# Patient Record
Sex: FEMALE
[De-identification: names, ages, dates, MRNs, and addresses within clinical notes are randomized per-mention and may not be internally consistent; named-entity substitution may affect disease eponyms.]

---

## 2023-08-31 ENCOUNTER — RX ONLY (RX ONLY)
Age: 43
End: 2023-08-31

## 2023-08-31 RX ORDER — NITROFURANTOIN MACROCRYSTALS 100 MG/1
CAPSULE ORAL
Qty: 20 | Refills: 0 | Status: ERX | COMMUNITY
Start: 2023-08-31

## 2023-08-31 RX ORDER — FLUCONAZOLE 150 MG/1
TABLET ORAL
Qty: 2 | Refills: 1 | Status: ERX | COMMUNITY
Start: 2023-08-31

## 2023-10-26 ENCOUNTER — APPOINTMENT (OUTPATIENT)
Dept: URBAN - METROPOLITAN AREA CLINIC 318 | Age: 43
Setting detail: DERMATOLOGY
End: 2023-10-26

## 2023-10-26 DIAGNOSIS — Z12.83 ENCOUNTER FOR SCREENING FOR MALIGNANT NEOPLASM OF SKIN: ICD-10-CM

## 2023-10-26 PROCEDURE — OTHER COUNSELING: OTHER

## 2023-10-26 NOTE — HPI: PREVENTATIVE SKIN CHECK
Okay, I have prescribed the oral clindamycin as prescribed.       Roxann Marr MD     What Is The Reason For Today's Visit?: Preventative Skin Check

## 2023-11-16 ENCOUNTER — RX ONLY (RX ONLY)
Age: 43
End: 2023-11-16

## 2023-11-16 RX ORDER — BIMATOPROST 0.3 MG/ML
SOLUTION/ DROPS OPHTHALMIC
Qty: 5 | Refills: 7 | Status: ERX | COMMUNITY
Start: 2023-11-16

## 2024-02-07 ENCOUNTER — RX ONLY (RX ONLY)
Age: 44
End: 2024-02-07

## 2024-02-07 RX ORDER — FLUCONAZOLE 150 MG/1
TABLET ORAL
Qty: 2 | Refills: 1 | Status: ERX | COMMUNITY
Start: 2024-02-07

## 2024-02-20 ENCOUNTER — RX ONLY (RX ONLY)
Age: 44
End: 2024-02-20

## 2024-02-20 RX ORDER — VALACYCLOVIR HYDROCHLORIDE 1 G/1
TABLET, FILM COATED ORAL
Qty: 30 | Refills: 3 | Status: ERX | COMMUNITY
Start: 2024-02-20

## 2024-04-10 ENCOUNTER — RX ONLY (RX ONLY)
Age: 44
End: 2024-04-10

## 2024-04-10 RX ORDER — ECONAZOLE NITRATE 10 MG/G
CREAM TOPICAL BID
Qty: 30 | Refills: 2 | Status: CANCELLED

## 2024-04-10 RX ORDER — MICONAZOLE NITRATE 1 %
KIT TOPICAL
Qty: 30 | Refills: 3 | Status: CANCELLED

## 2024-04-24 ENCOUNTER — RX ONLY (RX ONLY)
Age: 44
End: 2024-04-24

## 2024-04-24 RX ORDER — AZITHROMYCIN DIHYDRATE 250 MG/1
TABLET, FILM COATED ORAL
Qty: 1 | Refills: 1 | Status: ERX | COMMUNITY
Start: 2024-04-24

## 2024-09-01 ENCOUNTER — RX ONLY (RX ONLY)
Age: 44
End: 2024-09-01

## 2024-09-01 RX ORDER — FLUCONAZOLE 200 MG/1
1 TABLET TABLET ORAL
Qty: 2 | Refills: 4 | Status: CANCELLED

## 2024-09-01 RX ORDER — FLUCONAZOLE 150 MG/1
TABLET ORAL
Qty: 2 | Refills: 2 | Status: ERX

## 2024-12-11 ENCOUNTER — RX ONLY (RX ONLY)
Age: 44
End: 2024-12-11

## 2024-12-11 RX ORDER — CIPROFLOXACIN HYDROCHLORIDE 3 MG/ML
SOLUTION OPHTHALMIC
Qty: 5 | Refills: 1 | Status: ERX | COMMUNITY
Start: 2024-12-11

## 2024-12-19 ENCOUNTER — RX ONLY (RX ONLY)
Age: 44
End: 2024-12-19

## 2024-12-19 RX ORDER — AZITHROMYCIN DIHYDRATE 250 MG/1
TABLET, FILM COATED ORAL
Qty: 1 | Refills: 1 | Status: ERX

## 2025-01-01 ENCOUNTER — RX ONLY (RX ONLY)
Age: 45
End: 2025-01-01

## 2025-01-01 RX ORDER — AZITHROMYCIN DIHYDRATE 250 MG/1
TABLET, FILM COATED ORAL
Qty: 1 | Refills: 1 | Status: ACTIVE

## 2025-01-02 ENCOUNTER — APPOINTMENT (OUTPATIENT)
Age: 45
Setting detail: DERMATOLOGY
End: 2025-01-02

## 2025-01-06 ENCOUNTER — RX ONLY (RX ONLY)
Age: 45
End: 2025-01-06

## 2025-01-06 RX ORDER — AMOXICILLIN AND CLAVULANATE POTASSIUM 500; 125 MG/1; 1/1
TABLET, FILM COATED ORAL
Qty: 20 | Refills: 0 | Status: ERX | COMMUNITY
Start: 2025-01-06

## 2025-01-06 RX ORDER — METHYLPREDNISOLONE 4 MG/1
TABLET ORAL
Qty: 1 | Refills: 0 | Status: ERX | COMMUNITY
Start: 2025-01-06

## 2025-01-06 RX ORDER — FLUCONAZOLE 150 MG/1
TABLET ORAL
Qty: 2 | Refills: 2 | Status: ERX

## 2025-03-17 ENCOUNTER — RX ONLY (RX ONLY)
Age: 45
End: 2025-03-17

## 2025-03-17 RX ORDER — HYDROXYZINE HYDROCHLORIDE 10 MG/1
TABLET, FILM COATED ORAL
Qty: 60 | Refills: 2 | Status: ERX | COMMUNITY
Start: 2025-03-17

## 2025-03-31 ENCOUNTER — RX ONLY (RX ONLY)
Age: 45
End: 2025-03-31

## 2025-03-31 RX ORDER — FLUCONAZOLE 150 MG/1
TABLET ORAL
Qty: 2 | Refills: 2 | Status: CANCELLED

## 2025-04-07 ENCOUNTER — RX ONLY (RX ONLY)
Age: 45
End: 2025-04-07

## 2025-04-07 RX ORDER — FLUCONAZOLE 150 MG/1
TABLET ORAL
Qty: 2 | Refills: 2 | Status: CANCELLED